# Patient Record
Sex: FEMALE | Race: BLACK OR AFRICAN AMERICAN | NOT HISPANIC OR LATINO | URBAN - METROPOLITAN AREA
[De-identification: names, ages, dates, MRNs, and addresses within clinical notes are randomized per-mention and may not be internally consistent; named-entity substitution may affect disease eponyms.]

---

## 2021-10-13 ENCOUNTER — WEB ENCOUNTER (OUTPATIENT)
Dept: URBAN - METROPOLITAN AREA CLINIC 74 | Facility: CLINIC | Age: 41
End: 2021-10-13

## 2021-10-13 ENCOUNTER — DASHBOARD ENCOUNTERS (OUTPATIENT)
Age: 41
End: 2021-10-13

## 2021-10-13 ENCOUNTER — OFFICE VISIT (OUTPATIENT)
Dept: URBAN - METROPOLITAN AREA CLINIC 74 | Facility: CLINIC | Age: 41
End: 2021-10-13
Payer: MEDICAID

## 2021-10-13 DIAGNOSIS — Z87.11 HISTORY OF GASTRIC ULCER: ICD-10-CM

## 2021-10-13 DIAGNOSIS — R14.0 BLOATING: ICD-10-CM

## 2021-10-13 DIAGNOSIS — R14.1 GAS PAIN: ICD-10-CM

## 2021-10-13 PROBLEM — 275548000: Status: ACTIVE | Noted: 2021-10-13

## 2021-10-13 PROBLEM — 45979003: Status: ACTIVE | Noted: 2021-10-13

## 2021-10-13 PROCEDURE — 99203 OFFICE O/P NEW LOW 30 MIN: CPT | Performed by: STUDENT IN AN ORGANIZED HEALTH CARE EDUCATION/TRAINING PROGRAM

## 2021-10-13 NOTE — HPI-TODAY'S VISIT:
41-year-old female New to me Comes in for evaluation of bloating and longstanding history of intermittent self resolving pain in the right upper abdomen and chest. Patient states that the pain last for few seconds but when it comes it is severe in intensity.  It generally is associated with bloating.  It seems to travel within her chest and her right abdomen.  Sometimes patient can feel something is moving inside.  In between patient remains asymptomatic.  Otherwise patient has no other symptoms.  No nausea or vomiting.  Good appetite.  No weight loss.  No reflux or dysphagia.  No constipation or diarrhea.  No blood or mucus in stool. Patient in the past has tried PPI with minimal improvement in this particular symptom. Patient the past has also been checked for H. pylori breath test which was negative. Patient also had an ultrasound abdomen about a year back which was negative for any acute abnormality. No smoking.  No alcohol.  No marijuana use.  No significant NSAID use. No family history of GI malignancy. History of EGD in the past about 3 years back at which time few ulcers were noted in the stomach.  No obvious lesion was attributed to them and biopsies were negative for infection.  Patient was with PPI.